# Patient Record
Sex: FEMALE | Race: BLACK OR AFRICAN AMERICAN | NOT HISPANIC OR LATINO | URBAN - METROPOLITAN AREA
[De-identification: names, ages, dates, MRNs, and addresses within clinical notes are randomized per-mention and may not be internally consistent; named-entity substitution may affect disease eponyms.]

---

## 2021-06-10 ENCOUNTER — EMERGENCY (EMERGENCY)
Facility: HOSPITAL | Age: 81
LOS: 1 days | Discharge: HOME | End: 2021-06-10
Attending: EMERGENCY MEDICINE | Admitting: EMERGENCY MEDICINE
Payer: MEDICARE

## 2021-06-10 VITALS
TEMPERATURE: 99 F | DIASTOLIC BLOOD PRESSURE: 69 MMHG | RESPIRATION RATE: 18 BRPM | SYSTOLIC BLOOD PRESSURE: 147 MMHG | OXYGEN SATURATION: 99 % | HEART RATE: 89 BPM

## 2021-06-10 PROCEDURE — 72170 X-RAY EXAM OF PELVIS: CPT | Mod: 26

## 2021-06-10 PROCEDURE — 73564 X-RAY EXAM KNEE 4 OR MORE: CPT | Mod: 26,LT

## 2021-06-10 PROCEDURE — 99284 EMERGENCY DEPT VISIT MOD MDM: CPT

## 2021-06-10 RX ORDER — IBUPROFEN 200 MG
600 TABLET ORAL ONCE
Refills: 0 | Status: COMPLETED | OUTPATIENT
Start: 2021-06-10 | End: 2021-06-10

## 2021-06-10 RX ORDER — IBUPROFEN 200 MG
1 TABLET ORAL
Qty: 18 | Refills: 0
Start: 2021-06-10 | End: 2021-06-15

## 2021-06-10 RX ORDER — LIDOCAINE 4 G/100G
1 CREAM TOPICAL ONCE
Refills: 0 | Status: COMPLETED | OUTPATIENT
Start: 2021-06-10 | End: 2021-06-10

## 2021-06-10 RX ORDER — ACETAMINOPHEN 500 MG
975 TABLET ORAL ONCE
Refills: 0 | Status: COMPLETED | OUTPATIENT
Start: 2021-06-10 | End: 2021-06-10

## 2021-06-10 RX ADMIN — Medication 600 MILLIGRAM(S): at 12:06

## 2021-06-10 RX ADMIN — LIDOCAINE 1 PATCH: 4 CREAM TOPICAL at 12:06

## 2021-06-10 RX ADMIN — Medication 975 MILLIGRAM(S): at 12:05

## 2021-06-10 NOTE — ED PROVIDER NOTE - NSFOLLOWUPINSTRUCTIONS_ED_ALL_ED_FT
You were seen in the Emergency Department for Knee pain.    Follow up with an orthopedic surgeon within the week if your pain persists. Their information has been provided for your convenience.    You may take 600mg Ibuprofen (Advil) once every 8 hours as needed for pain. See medication label for warnings and use instructions.    You may take 650 mg Tylenol (acetaminophen) every eight hours as needed for pain.    You are being sent a prescription for lidocaine patches. Please see medication labels for instructions and warnings. You may want to purchase an alternative over the counter.    If you have weakness, continued knee swelling, fever, chills, nausea, vomiting, new or worsening pain, or if you have any new symptoms return to the Emergency Department.

## 2021-06-10 NOTE — ED PROVIDER NOTE - ATTENDING CONTRIBUTION TO CARE
Pt was seen and evaluated by me. Pt is a 79 y/o female with no significant PMHx who presented to the ED for left knee pain X 4 days s/p trauma. Pt states she hit her left knee into the wooden part of her bed 4 days ago. Pt states since having left knee pain, worse with movement. Pt denies any fever, chills, chills, nausea, vomiting, SOB, chest pain, or abd pain. Pt does admit to being on Gabapentin for nerve pain. Lungs CTA b/l. RRR. Abd soft, non-tender. FROM of b/l LE. No bony tenderness. No swelling or warmth appreciated. No calf tenderness or swelling. + distal pulses.  Concern for knee strain/fx  Analgesia,X-ray

## 2021-06-10 NOTE — ED PROVIDER NOTE - PATIENT PORTAL LINK FT
You can access the FollowMyHealth Patient Portal offered by [facility] by registering at the following website: [email or web address]. By joining [employer]’s FollowMyHealth portal, you will also be able to view your health information using other applications (apps) compatible with our system.

## 2021-06-10 NOTE — ED PROVIDER NOTE - MUSCULOSKELETAL, MLM
Spine appears normal, FROM of b/l LE. No increased swelling or warmth to knee. No bony tenderness. No calf tenderness or swelling.

## 2021-06-10 NOTE — ED PROVIDER NOTE - PROGRESS NOTE DETAILS
Dr. Baxter: Discussed X-ray findings. ACE wrap applied and pt is able to ambulate. Sent medication to pharmacy and advised f/u with Ortho.

## 2021-06-10 NOTE — ED ADULT NURSE NOTE - NSIMPLEMENTINTERV_GEN_ALL_ED
Implemented All Fall with Harm Risk Interventions:  Jansen to call system. Call bell, personal items and telephone within reach. Instruct patient to call for assistance. Room bathroom lighting operational. Non-slip footwear when patient is off stretcher. Physically safe environment: no spills, clutter or unnecessary equipment. Stretcher in lowest position, wheels locked, appropriate side rails in place. Provide visual cue, wrist band, yellow gown, etc. Monitor gait and stability. Monitor for mental status changes and reorient to person, place, and time. Review medications for side effects contributing to fall risk. Reinforce activity limits and safety measures with patient and family. Provide visual clues: red socks.

## 2021-06-10 NOTE — ED PROVIDER NOTE - CLINICAL SUMMARY MEDICAL DECISION MAKING FREE TEXT BOX
79 y/o female with no significant PMHx who presented to the ED for left knee pain X 4 days s/p trauma.   Concern for knee strain/fx  Analgesia,X-ray

## 2021-06-10 NOTE — ED ADULT NURSE NOTE - OBJECTIVE STATEMENT
Er pt coming with left knee pain has diff. walking or standing x few days since pt injured her knee against her bed frame 10 days ago, no swelling, no bruising noted, + for toes movements, medicated as order, to x-ray.   pt taking lunch prior to her pain meds, pending dispo.     Vanessa Dotson RN

## 2021-06-10 NOTE — ED PROVIDER NOTE - OBJECTIVE STATEMENT
81 y/o female with no significant PMHx who presented to the ED for left knee pain X 4 days s/p trauma. Pt states she hit her left knee into the wooden part of her bed 4 days ago. Pt states since having left knee pain, worse with movement. Pt denies any fever, chills, chills, nausea, vomiting, SOB, chest pain, or abd pain. Pt does admit to being on Gabapentin for nerve pain.